# Patient Record
Sex: FEMALE | URBAN - METROPOLITAN AREA
[De-identification: names, ages, dates, MRNs, and addresses within clinical notes are randomized per-mention and may not be internally consistent; named-entity substitution may affect disease eponyms.]

---

## 2021-04-20 ENCOUNTER — APPOINTMENT (OUTPATIENT)
Dept: URBAN - METROPOLITAN AREA CLINIC 256 | Age: 56
Setting detail: DERMATOLOGY
End: 2021-04-20

## 2021-04-20 VITALS — HEIGHT: 64 IN | WEIGHT: 145 LBS | RESPIRATION RATE: 14 BRPM

## 2021-04-20 DIAGNOSIS — D22 MELANOCYTIC NEVI: ICD-10-CM

## 2021-04-20 DIAGNOSIS — L0293 CARBUNCLE AND FURUNCLE OF UNSPECIFIED SITE: ICD-10-CM

## 2021-04-20 DIAGNOSIS — L0292 CARBUNCLE AND FURUNCLE OF UNSPECIFIED SITE: ICD-10-CM

## 2021-04-20 PROBLEM — L08.9 LOCAL INFECTION OF THE SKIN AND SUBCUTANEOUS TISSUE, UNSPECIFIED: Status: ACTIVE | Noted: 2021-04-20

## 2021-04-20 PROBLEM — D22.9 MELANOCYTIC NEVI, UNSPECIFIED: Status: ACTIVE | Noted: 2021-04-20

## 2021-04-20 PROCEDURE — OTHER INTRALESIONAL KENALOG: OTHER

## 2021-04-20 PROCEDURE — 11900 INJECT SKIN LESIONS </W 7: CPT

## 2021-04-20 PROCEDURE — OTHER COUNSELING: OTHER

## 2021-04-20 PROCEDURE — 99202 OFFICE O/P NEW SF 15 MIN: CPT | Mod: 25

## 2021-04-20 PROCEDURE — OTHER REASSURANCE: OTHER

## 2021-04-20 ASSESSMENT — LOCATION ZONE DERM: LOCATION ZONE: TRUNK

## 2021-04-20 ASSESSMENT — LOCATION SIMPLE DESCRIPTION DERM: LOCATION SIMPLE: RIGHT BUTTOCK

## 2021-04-20 ASSESSMENT — LOCATION DETAILED DESCRIPTION DERM: LOCATION DETAILED: RIGHT MEDIAL BUTTOCK

## 2022-04-11 ENCOUNTER — TRANSFERRED RECORDS (OUTPATIENT)
Dept: HEALTH INFORMATION MANAGEMENT | Facility: CLINIC | Age: 57
End: 2022-04-11
Payer: OTHER GOVERNMENT

## 2022-04-13 ENCOUNTER — APPOINTMENT (OUTPATIENT)
Dept: GENERAL RADIOLOGY | Facility: CLINIC | Age: 57
End: 2022-04-13
Attending: EMERGENCY MEDICINE
Payer: OTHER GOVERNMENT

## 2022-04-13 ENCOUNTER — APPOINTMENT (OUTPATIENT)
Dept: ULTRASOUND IMAGING | Facility: CLINIC | Age: 57
End: 2022-04-13
Attending: EMERGENCY MEDICINE
Payer: OTHER GOVERNMENT

## 2022-04-13 ENCOUNTER — HOSPITAL ENCOUNTER (EMERGENCY)
Facility: CLINIC | Age: 57
Discharge: HOME | End: 2022-04-13
Attending: EMERGENCY MEDICINE | Admitting: EMERGENCY MEDICINE
Payer: OTHER GOVERNMENT

## 2022-04-13 VITALS
TEMPERATURE: 98.4 F | HEART RATE: 66 BPM | RESPIRATION RATE: 18 BRPM | DIASTOLIC BLOOD PRESSURE: 76 MMHG | SYSTOLIC BLOOD PRESSURE: 118 MMHG | WEIGHT: 146 LBS | OXYGEN SATURATION: 98 %

## 2022-04-13 DIAGNOSIS — M79.662 PAIN OF LEFT LOWER LEG: ICD-10-CM

## 2022-04-13 DIAGNOSIS — M54.6 ACUTE BILATERAL THORACIC BACK PAIN: ICD-10-CM

## 2022-04-13 LAB
ANION GAP SERPL CALCULATED.3IONS-SCNC: 4 MMOL/L (ref 3–14)
BASOPHILS # BLD AUTO: 0 10E3/UL (ref 0–0.2)
BASOPHILS NFR BLD AUTO: 0 %
BUN SERPL-MCNC: 16 MG/DL (ref 7–30)
CALCIUM SERPL-MCNC: 8.9 MG/DL (ref 8.5–10.1)
CHLORIDE BLD-SCNC: 109 MMOL/L (ref 94–109)
CO2 SERPL-SCNC: 26 MMOL/L (ref 20–32)
CREAT SERPL-MCNC: 0.56 MG/DL (ref 0.52–1.04)
D DIMER PPP FEU-MCNC: 0.37 UG/ML FEU (ref 0–0.5)
EOSINOPHIL # BLD AUTO: 0.1 10E3/UL (ref 0–0.7)
EOSINOPHIL NFR BLD AUTO: 1 %
ERYTHROCYTE [DISTWIDTH] IN BLOOD BY AUTOMATED COUNT: 12.2 % (ref 10–15)
GFR SERPL CREATININE-BSD FRML MDRD: >90 ML/MIN/1.73M2
GLUCOSE BLD-MCNC: 94 MG/DL (ref 70–99)
HCT VFR BLD AUTO: 44.6 % (ref 35–47)
HGB BLD-MCNC: 15 G/DL (ref 11.7–15.7)
HOLD SPECIMEN: NORMAL
HOLD SPECIMEN: NORMAL
IMM GRANULOCYTES # BLD: 0 10E3/UL
IMM GRANULOCYTES NFR BLD: 0 %
LYMPHOCYTES # BLD AUTO: 2.8 10E3/UL (ref 0.8–5.3)
LYMPHOCYTES NFR BLD AUTO: 38 %
MCH RBC QN AUTO: 31.8 PG (ref 26.5–33)
MCHC RBC AUTO-ENTMCNC: 33.6 G/DL (ref 31.5–36.5)
MCV RBC AUTO: 95 FL (ref 78–100)
MONOCYTES # BLD AUTO: 0.5 10E3/UL (ref 0–1.3)
MONOCYTES NFR BLD AUTO: 6 %
NEUTROPHILS # BLD AUTO: 4 10E3/UL (ref 1.6–8.3)
NEUTROPHILS NFR BLD AUTO: 55 %
NRBC # BLD AUTO: 0 10E3/UL
NRBC BLD AUTO-RTO: 0 /100
PLATELET # BLD AUTO: 239 10E3/UL (ref 150–450)
POTASSIUM BLD-SCNC: 3.8 MMOL/L (ref 3.4–5.3)
RBC # BLD AUTO: 4.72 10E6/UL (ref 3.8–5.2)
SODIUM SERPL-SCNC: 139 MMOL/L (ref 133–144)
TROPONIN I SERPL HS-MCNC: 4 NG/L
WBC # BLD AUTO: 7.4 10E3/UL (ref 4–11)

## 2022-04-13 PROCEDURE — 93971 EXTREMITY STUDY: CPT | Mod: LT

## 2022-04-13 PROCEDURE — 85379 FIBRIN DEGRADATION QUANT: CPT | Performed by: EMERGENCY MEDICINE

## 2022-04-13 PROCEDURE — 71046 X-RAY EXAM CHEST 2 VIEWS: CPT

## 2022-04-13 PROCEDURE — 84484 ASSAY OF TROPONIN QUANT: CPT | Performed by: EMERGENCY MEDICINE

## 2022-04-13 PROCEDURE — 85025 COMPLETE CBC W/AUTO DIFF WBC: CPT | Performed by: EMERGENCY MEDICINE

## 2022-04-13 PROCEDURE — 82310 ASSAY OF CALCIUM: CPT | Performed by: EMERGENCY MEDICINE

## 2022-04-13 PROCEDURE — 36415 COLL VENOUS BLD VENIPUNCTURE: CPT | Performed by: EMERGENCY MEDICINE

## 2022-04-13 PROCEDURE — 99285 EMERGENCY DEPT VISIT HI MDM: CPT | Mod: 25

## 2022-04-13 PROCEDURE — 93005 ELECTROCARDIOGRAM TRACING: CPT

## 2022-04-13 ASSESSMENT — ENCOUNTER SYMPTOMS
COUGH: 0
NUMBNESS: 1
HEADACHES: 0
ARTHRALGIAS: 1
CHILLS: 0
ROS GI COMMENTS: INCONTINENCE -
SHORTNESS OF BREATH: 1
TREMORS: 1
BACK PAIN: 1
FEVER: 0

## 2022-04-13 NOTE — ED TRIAGE NOTES
Pt reports that she went to the orthopedic today and she told them about a recent travel that she was on the road for 2 days straight. PT reports that she has also been having SOB since yesterday morning and having some light headedness. Pt reports that she also is having back pain in the middle of the shoulder blades and left sided hip pain and some numbness into the left leg, pt reports some issues with disks in her back. PT reports bilateral blurred vision that worsens with reading, no aphasia. VSS and ABC's intact, orthopedic reffered pt to ED

## 2022-04-13 NOTE — ED PROVIDER NOTES
"  History     Chief Complaint:  Multiple Symptoms     HPI:  The history is provided by the patient.      Myah Kaur is a 56 year old female with a history of anxiety, tobacco use, hyperlipidemia who presents for evaluation of various symptoms. She reports that she was at a visit with orthopedics this morning for consultation for physical therapy to manage her lumbar radiculopathy. She states that she has been experiencing left hip/lower back pain with associated numbness and tingling down her left leg which has been ongoing for many years, and this is what she was visiting the orthopedic office for today. This was the first time she had been evaluated by orthopedics for this issue in many years. During their evaluation, she noted that she was feeling \"shaky,\" and upon hearing of recent prolonged immobilization while traveling (drive to [place]), she was instructed to seek ED evaluation. She called her primary doctor's office and spoke with a triage nurse to whom she mentioned that she has been experiencing some shortness of breath since yesterday. When she also informed them that she has recently been traveling and drove to and from [place] on April 1st-2nd and April 5th, respectively, she was advised to come into the ED as soon as possible. Here in the ED, Myah also mentions that yesterday, she noticed that her vision appeared \"fuzzy\" while trying to read a book after she had been looking at her phone. She reports that this has happened before. She denies any diplopia or vision loss. She additionally notes that she has been experiencing an intermittent pain to her left upper back, and this has been bothering her more recently.  This is worsened by certain stretching movements, bending, as well as palpation. Myah denies any incontinence of urine or stool, numbness in her groin area, leg swelling, chest pain, cough, fever, chills, headaches. She denies any personal history of heart disease; " however, she notes a family history in her father. She reports that she is a tobacco smoker.    Review of Systems   Constitutional: Negative for chills and fever.   Eyes: Positive for visual disturbance.   Respiratory: Positive for shortness of breath. Negative for cough.    Cardiovascular: Negative for chest pain and leg swelling.   Gastrointestinal:        Incontinence -   Genitourinary:        Groin numbness-  Incontinence -   Musculoskeletal: Positive for arthralgias and back pain.   Neurological: Positive for tremors and numbness. Negative for headaches.   All other systems reviewed and are negative.    Allergies:  Sulfa antibiotics    Medications:  Propanolol  Hydroxyzine     Past Medical History:     Panic disorder   Anxiety   Tobacco use  Hidradenitis suppurativa  Sprain of ligament of lumbosacral joint  Hyperlipidemia   Tick bite    Past Surgical History:    Tubal ligation  Lumbar laminectomy      Family History:    Father: diabetes, heart disease  Mother: brain tumor  Brothers: MS, Crohn's, hyperlipidemia     Social History:  The patient presents to the ED with her .  The patient presents to the ED via car.  The patient is a smoker.    Physical Exam     Patient Vitals for the past 24 hrs:   BP Temp Temp src Pulse Resp SpO2 Weight   04/13/22 2145 118/76 -- -- 66 -- 98 % --   04/13/22 2100 128/83 -- -- 69 -- 100 % --   04/13/22 2015 133/79 -- -- 54 -- 98 % --   04/13/22 2000 127/80 -- -- 57 -- 99 % --   04/13/22 1546 (!) 165/88 98.4  F (36.9  C) Temporal 84 18 100 % 66.2 kg (146 lb)     Physical Exam  General:              Well-nourished              Speaking in full sentences  Eyes:              Conjunctiva without injection or scleral icterus  ENT:              Moist mucous membranes              Nares patent              Pinnae normal  Neck:              Full ROM              No stiffness appreciated  Resp:              Lungs CTAB              No crackles, wheezing or audible rubs              Good  air movement  CV:                    Normal rate, regular rhythm              S1 and S2 present              No murmur, gallop or rub  GI:              BS present              Abdomen soft without distention              Non-tender to light and deep palpation              No guarding or rebound tenderness  Skin:              Warm, dry, well perfused              No rashes or open wounds on exposed skin  MSK:              Moves all extremities              No focal deformities or swelling              No lower extremity swelling or tenderness              2+ DP pulse  Neuro:              Alert              5/5 ankle dorsi/plantarflexion              5/5 hip flexion              SILT in BLE              No clonus at the ankles              Answers questions appropriately              Moves all extremities equally              Gait stable  Psych:              Normal affect, normal mood    Emergency Department Course     ECG:  ECG taken at 1627, ECG read at 1847  Normal sinus rhythm  Normal ECG  Rate 67 bpm. UT interval 134 ms. QRS duration 88 ms. QT/QTc 400/422 ms. P-R-T axes 63 73 56.     Imaging:  US Lower Extremity Venous Duplex Left   Final Result   IMPRESSION:   1.  No deep venous thrombosis in the left lower extremity.      Chest XR,  PA & LAT   Final Result   IMPRESSION: Negative chest.      Report per radiology    Laboratory:  Labs Ordered and Resulted from Time of ED Arrival to Time of ED Departure   BASIC METABOLIC PANEL - Normal       Result Value    Sodium 139      Potassium 3.8      Chloride 109      Carbon Dioxide (CO2) 26      Anion Gap 4      Urea Nitrogen 16      Creatinine 0.56      Calcium 8.9      Glucose 94      GFR Estimate >90     TROPONIN I - Normal    Troponin I High Sensitivity 4     D DIMER QUANTITATIVE - Normal    D-Dimer Quantitative 0.37     CBC WITH PLATELETS AND DIFFERENTIAL    WBC Count 7.4      RBC Count 4.72      Hemoglobin 15.0      Hematocrit 44.6      MCV 95      MCH 31.8      MCHC  33.6      RDW 12.2      Platelet Count 239      % Neutrophils 55      % Lymphocytes 38      % Monocytes 6      % Eosinophils 1      % Basophils 0      % Immature Granulocytes 0      NRBCs per 100 WBC 0      Absolute Neutrophils 4.0      Absolute Lymphocytes 2.8      Absolute Monocytes 0.5      Absolute Eosinophils 0.1      Absolute Basophils 0.0      Absolute Immature Granulocytes 0.0      Absolute NRBCs 0.0        Emergency Department Course:       Reviewed:  I reviewed nursing notes, vitals, past medical history, Care Everywhere    Assessments:  1849 I obtained history and examined the patient as noted above.   2112 I rechecked the patient and explained findings.   2158 The patient was rechecked and updated.     Disposition:  The patient was discharged to home.     Impression & Plan     Medical Decision Making:  Myah Kaur is a 56 year old female who presents to the emergency department for evaluation of various complaints.  VS on presentation reveal elevated BP which improved during patient's ED course.  Patient's primary concern is discomfort down her left leg, having been referred to the ED for further evaluation by orthopedics.  She is concerned for DVT in light of recent car travel.  No gross swelling noted on exam.  Venous ultrasound and D-dimer within normal limits for which I feel venous thromboembolic disease to be very unlikely. Her leg symptoms are most consistent with lumbar radiculopathy.  No objective motor or sensory deficits, saddle anesthesia, bowel/bladder incontinence that would suggest cauda equina requiring MRI. She noted a episode of shortness of breath yesterday, which she attributes to anxiety.  This has resolved and she is currently asymptomatic.  In the absence of tachycardia, hypoxia, pleuritic discomfort, and normal D-dimer I feel pulmonary embolism to be unlikely and feel CT chest can be deferred safely.  She denies any associated chest pain or chest pressure.  EKG demonstrates  sinus rhythm without findings of acute ischemia and troponin has returned normal.  Do feel further cardiac evaluation can be deferred safely.  Chest x-ray negative for pneumothorax, pneumonia, or widened mediastinum.  She does acknowledge discomfort to her posterior upper thorax which is exacerbated by movement as well as position changes.  I feel this is unlikely to represent aortic dissection.  Patient lastly acknowledged transient blurriness in her vision yesterday.  She notes this is quite common and not new for her.  She denies any vision loss or diplopia.  Denies associated headache.  No other neurologic symptoms.  I do not feel this represents acute ocular pathology such as angle-closure glaucoma or intracranial pathology such as CVA, TIA warranting further imaging.  Results and clinical impression were discussed with the patient.  We discussed smoking cessation.  Continue to monitor symptoms closely.  Follow-up with PCP in 2 to 3 days for recheck or return to ED with any new or troubling symptoms. Continue with PT as previously recommended. Questions answered prior to discharge.    Diagnosis:    ICD-10-CM    1. Pain of left lower leg  M79.662    2. Acute bilateral thoracic back pain  M54.6      Scribe Disclosure:  I, Diana Ruiz, am serving as a scribe at 6:37 PM on 4/13/2022 to document services personally performed by Jerome Hurt MD based on my observations and the provider's statements to me.        Jerome Hurt MD  04/13/22 8208

## 2022-04-14 LAB
ATRIAL RATE - MUSE: 67 BPM
DIASTOLIC BLOOD PRESSURE - MUSE: NORMAL MMHG
INTERPRETATION ECG - MUSE: NORMAL
P AXIS - MUSE: 63 DEGREES
PR INTERVAL - MUSE: 134 MS
QRS DURATION - MUSE: 88 MS
QT - MUSE: 400 MS
QTC - MUSE: 422 MS
R AXIS - MUSE: 73 DEGREES
SYSTOLIC BLOOD PRESSURE - MUSE: NORMAL MMHG
T AXIS - MUSE: 56 DEGREES
VENTRICULAR RATE- MUSE: 67 BPM

## 2022-04-14 NOTE — DISCHARGE INSTRUCTIONS
Monitor symptoms closely.  Keep leg elevated.  Follow-up with PT as previously discussed    Return to ED with worsened pain, difficulties breathing, or any other concerns.

## 2022-05-22 ENCOUNTER — HEALTH MAINTENANCE LETTER (OUTPATIENT)
Age: 57
End: 2022-05-22

## 2022-09-18 ENCOUNTER — HEALTH MAINTENANCE LETTER (OUTPATIENT)
Age: 57
End: 2022-09-18

## 2023-01-29 ENCOUNTER — HEALTH MAINTENANCE LETTER (OUTPATIENT)
Age: 58
End: 2023-01-29

## 2024-02-25 ENCOUNTER — HEALTH MAINTENANCE LETTER (OUTPATIENT)
Age: 59
End: 2024-02-25

## 2024-04-04 ENCOUNTER — HOSPITAL ENCOUNTER (EMERGENCY)
Facility: CLINIC | Age: 59
Discharge: HOME | End: 2024-04-04
Attending: STUDENT IN AN ORGANIZED HEALTH CARE EDUCATION/TRAINING PROGRAM | Admitting: STUDENT IN AN ORGANIZED HEALTH CARE EDUCATION/TRAINING PROGRAM
Payer: OTHER GOVERNMENT

## 2024-04-04 VITALS
DIASTOLIC BLOOD PRESSURE: 92 MMHG | HEART RATE: 74 BPM | TEMPERATURE: 98.6 F | RESPIRATION RATE: 18 BRPM | WEIGHT: 146 LBS | HEIGHT: 66 IN | BODY MASS INDEX: 23.46 KG/M2 | SYSTOLIC BLOOD PRESSURE: 176 MMHG | OXYGEN SATURATION: 99 %

## 2024-04-04 DIAGNOSIS — R25.1 TREMOR: ICD-10-CM

## 2024-04-04 DIAGNOSIS — R03.0 ELEVATED BLOOD PRESSURE READING: ICD-10-CM

## 2024-04-04 DIAGNOSIS — R51.9 NONINTRACTABLE HEADACHE, UNSPECIFIED CHRONICITY PATTERN, UNSPECIFIED HEADACHE TYPE: ICD-10-CM

## 2024-04-04 DIAGNOSIS — T88.7XXA MEDICATION SIDE EFFECTS: Primary | ICD-10-CM

## 2024-04-04 DIAGNOSIS — F41.9 ANXIETY: ICD-10-CM

## 2024-04-04 PROBLEM — G47.00 INSOMNIA: Status: ACTIVE | Noted: 2024-04-04

## 2024-04-04 PROBLEM — M89.9 DISORDER OF BONE: Status: ACTIVE | Noted: 2024-04-04

## 2024-04-04 PROBLEM — M26.629 TENDERNESS OF TEMPOROMANDIBULAR JOINT: Status: ACTIVE | Noted: 2024-03-05

## 2024-04-04 PROBLEM — F41.0 PANIC DISORDER: Status: ACTIVE | Noted: 2021-10-18

## 2024-04-04 PROBLEM — L73.1 INGROWN HAIR: Status: ACTIVE | Noted: 2020-09-22

## 2024-04-04 LAB
ATRIAL RATE - MUSE: 64 BPM
DIASTOLIC BLOOD PRESSURE - MUSE: NORMAL MMHG
INTERPRETATION ECG - MUSE: NORMAL
P AXIS - MUSE: 67 DEGREES
PR INTERVAL - MUSE: 154 MS
QRS DURATION - MUSE: 88 MS
QT - MUSE: 402 MS
QTC - MUSE: 414 MS
R AXIS - MUSE: 59 DEGREES
SYSTOLIC BLOOD PRESSURE - MUSE: NORMAL MMHG
T AXIS - MUSE: 51 DEGREES
VENTRICULAR RATE- MUSE: 64 BPM

## 2024-04-04 PROCEDURE — 250N000011 HC RX IP 250 OP 636: Performed by: STUDENT IN AN ORGANIZED HEALTH CARE EDUCATION/TRAINING PROGRAM

## 2024-04-04 PROCEDURE — 99284 EMERGENCY DEPT VISIT MOD MDM: CPT | Mod: 25

## 2024-04-04 PROCEDURE — 96374 THER/PROPH/DIAG INJ IV PUSH: CPT

## 2024-04-04 PROCEDURE — 93005 ELECTROCARDIOGRAM TRACING: CPT

## 2024-04-04 RX ORDER — KETOROLAC TROMETHAMINE 15 MG/ML
15 INJECTION, SOLUTION INTRAMUSCULAR; INTRAVENOUS ONCE
Status: COMPLETED | OUTPATIENT
Start: 2024-04-04 | End: 2024-04-04

## 2024-04-04 RX ADMIN — KETOROLAC TROMETHAMINE 15 MG: 15 INJECTION, SOLUTION INTRAMUSCULAR; INTRAVENOUS at 17:30

## 2024-04-04 ASSESSMENT — COLUMBIA-SUICIDE SEVERITY RATING SCALE - C-SSRS
6. HAVE YOU EVER DONE ANYTHING, STARTED TO DO ANYTHING, OR PREPARED TO DO ANYTHING TO END YOUR LIFE?: NO
1. IN THE PAST MONTH, HAVE YOU WISHED YOU WERE DEAD OR WISHED YOU COULD GO TO SLEEP AND NOT WAKE UP?: NO
2. HAVE YOU ACTUALLY HAD ANY THOUGHTS OF KILLING YOURSELF IN THE PAST MONTH?: NO

## 2024-04-04 ASSESSMENT — ACTIVITIES OF DAILY LIVING (ADL)
ADLS_ACUITY_SCORE: 35

## 2024-04-04 NOTE — ED TRIAGE NOTES
Per EMS, patient coming from dentist appointment where she had tooth extraction. Post procedure, patient had high blood pressure and shaking after words. Systolic bp in 230's. Given 50 mcg Fentanyl to try to bring down BP with out success. Patient feeling anxious. Denies chest pain, SOB and dizziness.      Triage Assessment (Adult)       Row Name 04/04/24 1556          Triage Assessment    Airway WDL WDL        Respiratory WDL    Respiratory WDL WDL        Skin Circulation/Temperature WDL    Skin Circulation/Temperature WDL WDL        Cardiac WDL    Cardiac WDL WDL        Peripheral/Neurovascular WDL    Peripheral Neurovascular WDL WDL        Cognitive/Neuro/Behavioral WDL    Cognitive/Neuro/Behavioral WDL WDL

## 2024-04-04 NOTE — ED PROVIDER NOTES
"History   Chief Complaint:  Medication Reaction       HPI:  Myah Kaur is a very pleasant 58 year old female with history of TMJ presenting with a medication reaction. Patient presents from the dentist via EMS after a tooth extraction at 1445. She reports that she started to feel tremulous and \"didn't feel right\" after the tooth was pulled. Notes she felt lightheaded but the tooth was successfully pulled and the gas was off.  Going to paperwork from the dentist, the dentist administered 3 mL of lidocaine 2% with epinephrine and used nitrous oxide during to the extraction. EMS reports patient's blood pressure was 235 systolic and gave 50 mcg fentanyl en route. She was shaky and hypertensive upon arrival. Endorses a severe headache and anxiety secondary to ongoing TMJ and generally feeling uncomfortable. Patient reports increased stress. Per patient's , Myah has been experiencing \"trouble with her mouth\" for months. He notes that the shaking is new and she has never had this before. Myah reports that she was prescribed steroids for TMJ management with some relief. She recently returned from a 3 day drive from [place] with tooth pain and was told she had an abscess.     Independent Historian:  Independent history was obtained from the patient's spouse. They provided information detailed above in HPI.     Review of External Notes:  I personally reviewed notes from the patient's  dental encounter  dated today. This provided me with information regarding  medication administration and blood pressure at dental clinic .     I personally reviewed the patient's chart, including available medication list and available past medical history, past surgical history, family history, and social history.    Physical Exam   Patient Vitals for the past 24 hrs:   BP Temp Temp src Pulse Resp SpO2 Height Weight   04/04/24 1800 (!) 176/92 -- -- 74 18 99 % -- --   04/04/24 1730 (!) 146/89 -- -- 73 18 98 % -- --   04/04/24 " "1708 (!) 140/82 -- -- 76 18 98 % -- --   04/04/24 1556 (!) 194/111 -- -- -- -- 91 % -- --   04/04/24 1555 (!) 194/111 98.6  F (37  C) Oral 78 18 -- 1.676 m (5' 6\") 66.2 kg (146 lb)      Physical Exam  Vitals and nursing note reviewed.   Constitutional:       Appearance: Normal appearance.      Comments: Anxious appearing, tremulous   HENT:      Head: Normocephalic and atraumatic.      Jaw: Trismus present.      Comments: Patient has some trismus, which she states is baseline for her secondary to TMJ.     Mouth/Throat:      Mouth: Mucous membranes are moist.      Comments: There is gauze packing in the right side of the patient's mouth, overlying extracted tooth #18  Cardiovascular:      Rate and Rhythm: Normal rate and regular rhythm.      Pulses: Normal pulses.      Heart sounds: Normal heart sounds.   Musculoskeletal:         General: No signs of injury. Normal range of motion.   Neurological:      General: No focal deficit present.      Mental Status: She is alert and oriented to person, place, and time.      Cranial Nerves: No cranial nerve deficit.      Sensory: No sensory deficit.      Motor: No weakness.       Emergency Department Course     ECG:   ECG results from 04/04/24   EKG 12 lead     Value    Systolic Blood Pressure     Diastolic Blood Pressure     Ventricular Rate 64    Atrial Rate 64    WI Interval 154    QRS Duration 88        QTc 414    P Axis 67    R AXIS 59    T Axis 51    Interpretation ECG      Sinus rhythm  Normal ECG  When compared with ECG of 13-APR-2022 16:27,  No significant change was found       My interpretation     Interventions & Assessments:  Interventions:  Medications   ketorolac (TORADOL) injection 15 mg (15 mg Intravenous $Given 4/4/24 1732)        Assessments:  Consultations/Discussion of Management or Tests:  Independent Interpretation (X-rays, CT Head, rhythm strip):  ED Course as of 04/04/24 1851   Thu Apr 04, 2024   1553 I obtained patient history and performed a " physical exam.        Social Determinants of Health affecting care:   Increased stress.     Disposition:  The patient was discharged to home.     Impression & Plan          MIPS (If applicable):  N/A    Medical Decision Making:  Patient presenting with elevated blood pressure, anxiety and tremors from dentist.  Vital signs notable for elevated blood pressure but otherwise reassuring.  Physical exam notable for patient appearing female with mild intermittent tremors.  Suspicion high for medication side effect, possible mild lidocaine toxicity.  The 3 mL that was reportedly used is well below the toxic threshold.  Did not suspect patient is at risk for LAST syndrome.  Did obtain EKG, which was reassuring.  Acute stress reaction or anxiety is also possible, especially considering patient's history of generalized anxiety disorder and panic disorder.  Did proceed to observe the patient in the emergency department for almost 3 hours and the patient had improvement in his symptoms, such that the tremors and significant anxiety had resolved.  Her blood pressure also reduced, though remained elevated.  She did have a headache and was treated successfully with Toradol.  She was able to call her dentist and obtain follow-up and instructions.  Felt patient was appropriate for discharge. Findings were discussed.  Additional verbal instructions were provided.  I discussed specific warning signs and instructed the patient to return to the emergency department if there are any concerns. Understanding of instructions was voiced, questions were answered and the patient was discharged.     Diagnosis:    ICD-10-CM    1. Medication side effects  T88.7XXA       2. Elevated blood pressure reading  R03.0       3. Tremor  R25.1       4. Nonintractable headache, unspecified chronicity pattern, unspecified headache type  R51.9       5. Anxiety  F41.9            Discharge Medications:  There are no discharge medications for this  patient.      Ibis Gibbons  4/4/2024      Darrin Ugalde MD  04/04/24 [id number]

## 2024-04-04 NOTE — DISCHARGE INSTRUCTIONS
Thank you for allowing us to evaluate you today.  Follow up with primary care clinician  in 1 week as needed for reevaluation..  Immediately return to the emergency department with any concerns.

## 2024-04-04 NOTE — ED NOTES
Bed: ED21  Expected date:   Expected time:   Means of arrival:   Comments:  Katharine 2 58 F post op complications after tooth extraction agitated and hypertensive 220/p

## 2024-11-07 ENCOUNTER — APPOINTMENT (OUTPATIENT)
Dept: URBAN - METROPOLITAN AREA CLINIC 253 | Age: 59
Setting detail: DERMATOLOGY
End: 2024-11-08

## 2024-11-07 VITALS — WEIGHT: 145 LBS | RESPIRATION RATE: 14 BRPM | HEIGHT: 65 IN

## 2024-11-07 DIAGNOSIS — L73.2 HIDRADENITIS SUPPURATIVA: ICD-10-CM

## 2024-11-07 PROCEDURE — 99204 OFFICE O/P NEW MOD 45 MIN: CPT | Mod: 25

## 2024-11-07 PROCEDURE — OTHER COUNSELING: OTHER

## 2024-11-07 PROCEDURE — OTHER INTRALESIONAL KENALOG: OTHER

## 2024-11-07 PROCEDURE — 11900 INJECT SKIN LESIONS </W 7: CPT

## 2024-11-07 PROCEDURE — OTHER PRESCRIPTION: OTHER

## 2024-11-07 PROCEDURE — OTHER PRESCRIPTION MEDICATION MANAGEMENT: OTHER

## 2024-11-07 PROCEDURE — OTHER SEPARATE AND IDENTIFIABLE DOCUMENTATION: OTHER

## 2024-11-07 PROCEDURE — OTHER ADDITIONAL NOTES: OTHER

## 2024-11-07 PROCEDURE — OTHER MIPS QUALITY: OTHER

## 2024-11-07 RX ORDER — CLINDAMYCIN PHOSPHATE 10 MG/ML
SOLUTION TOPICAL
Qty: 60 | Refills: 5 | Status: ERX | COMMUNITY
Start: 2024-11-07

## 2024-11-07 RX ORDER — DOXYCYCLINE 100 MG/1
TABLET, FILM COATED ORAL BID
Qty: 60 | Refills: 0 | Status: ERX | COMMUNITY
Start: 2024-11-07

## 2024-11-07 RX ORDER — CHLORHEXIDINE GLUCONATE 213 G/1000ML
SOLUTION TOPICAL
Qty: 236 | Refills: 5 | Status: ERX | COMMUNITY
Start: 2024-11-07

## 2024-11-07 ASSESSMENT — LOCATION SIMPLE DESCRIPTION DERM
LOCATION SIMPLE: GLUTEAL CLEFT
LOCATION SIMPLE: GROIN

## 2024-11-07 ASSESSMENT — LOCATION ZONE DERM
LOCATION ZONE: VULVA
LOCATION ZONE: TRUNK

## 2024-11-07 ASSESSMENT — LOCATION DETAILED DESCRIPTION DERM
LOCATION DETAILED: MONS PUBIS
LOCATION DETAILED: GLUTEAL CLEFT

## 2024-11-07 NOTE — HPI: SKIN LESION
What Type Of Note Output Would You Prefer (Optional)?: Standard Output
How Severe Is Your Skin Lesion?: moderate
Is This A New Presentation, Or A Follow-Up?: Skin Lesions
Additional History: Patient states the lesions are inflamed. She is concerned for infection.

## 2024-11-07 NOTE — PROCEDURE: INTRALESIONAL KENALOG
Consent: The risks of atrophy were reviewed with the patient.
Include Z78.9 (Other Specified Conditions Influencing Health Status) As An Associated Diagnosis?: No
Expiration Date For Kenalog (Optional): April 2026
X Size Of Lesion In Cm (Optional): 0
Show Inventory Tab: Hide
Ndc# For Kenalog Only: 4741-9702-82
Kenalog Preparation: Kenalog
Concentration Of Kenalog Solution Injected (Mg/Ml): 10.0
Total Volume (Ccs): 0.2
Detail Level: Detailed
Medical Necessity Clause: This procedure was medically necessary because the lesions that were treated were:
Lot # For Kenalog (Optional): [id number]
Kenalog Type Of Vial: Multiple Dose
Administered By (Optional): Salome Bella PA-C
Validate Note Data When Using Inventory: Yes

## 2024-11-07 NOTE — PROCEDURE: PRESCRIPTION MEDICATION MANAGEMENT
Initiate Treatment: doxycycline monohydrate 100 mg tablet BID\\nclindamycin 1 % lotion QD\\nHibiclens 4 % topical liquid
Render In Strict Bullet Format?: No
Detail Level: Zone

## 2024-11-07 NOTE — PROCEDURE: ADDITIONAL NOTES
Render Risk Assessment In Note?: no
Detail Level: Simple
Additional Notes: -Discussed that ILK are a treatment option for those who experience less frequent flares. Patient explained that her flares are infrequent. \\n-Explained that the definitive treatment option is surgical removal, however, lancing and draining is not ideal, as it tends to move and can leave unwanted scar tissue. \\n-Patient aware of biologic treatment options, such as Cosentyx and Humira.\\n-Recommended use of an antiseptic wash and antibacterial solution to help control at home\\n-Patient asked if squeezing the lesions is OK—patient aware that if lesion is oozing, it is fine, otherwise avoid squeezing, as to not exacerbate inflammation.

## 2025-01-12 ENCOUNTER — HEALTH MAINTENANCE LETTER (OUTPATIENT)
Age: 60
End: 2025-01-12

## 2025-03-15 ENCOUNTER — HEALTH MAINTENANCE LETTER (OUTPATIENT)
Age: 60
End: 2025-03-15

## 2025-04-24 ENCOUNTER — APPOINTMENT (OUTPATIENT)
Dept: URBAN - METROPOLITAN AREA CLINIC 253 | Age: 60
Setting detail: DERMATOLOGY
End: 2025-04-29

## 2025-04-24 VITALS — WEIGHT: 147 LBS | RESPIRATION RATE: 15 BRPM | HEIGHT: 55 IN

## 2025-04-24 DIAGNOSIS — D18.0 HEMANGIOMA: ICD-10-CM

## 2025-04-24 DIAGNOSIS — L73.2 HIDRADENITIS SUPPURATIVA: ICD-10-CM

## 2025-04-24 DIAGNOSIS — D22 MELANOCYTIC NEVI: ICD-10-CM

## 2025-04-24 DIAGNOSIS — L81.4 OTHER MELANIN HYPERPIGMENTATION: ICD-10-CM

## 2025-04-24 DIAGNOSIS — L82.1 OTHER SEBORRHEIC KERATOSIS: ICD-10-CM

## 2025-04-24 DIAGNOSIS — Z71.89 OTHER SPECIFIED COUNSELING: ICD-10-CM

## 2025-04-24 PROBLEM — D22.5 MELANOCYTIC NEVI OF TRUNK: Status: ACTIVE | Noted: 2025-04-24

## 2025-04-24 PROBLEM — D18.01 HEMANGIOMA OF SKIN AND SUBCUTANEOUS TISSUE: Status: ACTIVE | Noted: 2025-04-24

## 2025-04-24 PROCEDURE — OTHER PRESCRIPTION MEDICATION MANAGEMENT: OTHER

## 2025-04-24 PROCEDURE — 99214 OFFICE O/P EST MOD 30 MIN: CPT

## 2025-04-24 PROCEDURE — OTHER ADDITIONAL NOTES: OTHER

## 2025-04-24 PROCEDURE — OTHER MIPS QUALITY: OTHER

## 2025-04-24 PROCEDURE — OTHER COUNSELING: OTHER

## 2025-04-24 ASSESSMENT — LOCATION ZONE DERM
LOCATION ZONE: TRUNK
LOCATION ZONE: VULVA

## 2025-04-24 ASSESSMENT — LOCATION DETAILED DESCRIPTION DERM
LOCATION DETAILED: MONS PUBIS
LOCATION DETAILED: SUPERIOR LUMBAR SPINE
LOCATION DETAILED: INFERIOR THORACIC SPINE

## 2025-04-24 ASSESSMENT — LOCATION SIMPLE DESCRIPTION DERM
LOCATION SIMPLE: UPPER BACK
LOCATION SIMPLE: LOWER BACK
LOCATION SIMPLE: GROIN

## 2025-04-24 NOTE — HPI: FULL BODY SKIN EXAMINATION
What Type Of Note Output Would You Prefer (Optional)?: Standard Output
What Is The Reason For Today's Visit?: Full Body Skin Examination with No Concerns
What Is The Reason For Today's Visit? (Being Monitored For X): concerning skin lesions on an annual basis
Additional History: Patient reports no specific spots of concern but she does have HS and wants that checked out.

## 2025-04-24 NOTE — PROCEDURE: PRESCRIPTION MEDICATION MANAGEMENT
Continue Regimen: clindamycin 1 % lotion QD\\nHibiclens 4 % topical liquid
Render In Strict Bullet Format?: No
Detail Level: Zone

## 2025-04-24 NOTE — PROCEDURE: ADDITIONAL NOTES
Render Risk Assessment In Note?: no
Detail Level: Simple
Additional Notes: -informed patient that if it becomes painfully inflamed again to come back for more ILK injections.\\n-Discussed that ILK are a treatment option for those who experience less frequent flares. Patient explained that her flares are infrequent. \\n-Patient asked if squeezing the lesions is OK—patient aware that if lesion is oozing, it is fine, otherwise avoid squeezing, as to not exacerbate inflammation.

## 2025-06-09 ENCOUNTER — APPOINTMENT (OUTPATIENT)
Dept: URBAN - METROPOLITAN AREA CLINIC 252 | Age: 60
Setting detail: DERMATOLOGY
End: 2025-06-09

## 2025-06-09 VITALS — WEIGHT: 145 LBS | HEIGHT: 65 IN | RESPIRATION RATE: 16 BRPM

## 2025-06-09 DIAGNOSIS — L0390 CELLULITIS AND ABSCESS OF UNSPECIFIED SITES: ICD-10-CM

## 2025-06-09 DIAGNOSIS — L73.2 HIDRADENITIS SUPPURATIVA: ICD-10-CM

## 2025-06-09 DIAGNOSIS — L0391 CELLULITIS AND ABSCESS OF UNSPECIFIED SITES: ICD-10-CM

## 2025-06-09 PROBLEM — L03.311 CELLULITIS OF ABDOMINAL WALL: Status: ACTIVE | Noted: 2025-06-09

## 2025-06-09 PROCEDURE — OTHER INCISION AND DRAINAGE: OTHER

## 2025-06-09 PROCEDURE — OTHER PRESCRIPTION: OTHER

## 2025-06-09 PROCEDURE — OTHER COUNSELING: OTHER

## 2025-06-09 PROCEDURE — 99213 OFFICE O/P EST LOW 20 MIN: CPT | Mod: 25

## 2025-06-09 PROCEDURE — OTHER MEDICATION COUNSELING: OTHER

## 2025-06-09 PROCEDURE — 10060 I&D ABSCESS SIMPLE/SINGLE: CPT

## 2025-06-09 PROCEDURE — OTHER ADDITIONAL NOTES: OTHER

## 2025-06-09 RX ORDER — DOXYCYCLINE MONOHYDRATE 100 MG/1
CAPSULE ORAL BID
Qty: 10 | Refills: 0 | Status: ERX | COMMUNITY
Start: 2025-06-09

## 2025-06-09 RX ORDER — FLUCONAZOLE 150 MG/1
TABLET ORAL
Qty: 2 | Refills: 0 | Status: ERX | COMMUNITY
Start: 2025-06-09

## 2025-06-09 ASSESSMENT — LOCATION SIMPLE DESCRIPTION DERM
LOCATION SIMPLE: ABDOMEN
LOCATION SIMPLE: LEFT BREAST

## 2025-06-09 ASSESSMENT — LOCATION DETAILED DESCRIPTION DERM
LOCATION DETAILED: LEFT INFRAMAMMARY CREASE (INNER QUADRANT)
LOCATION DETAILED: LEFT RIB CAGE

## 2025-06-09 ASSESSMENT — LOCATION ZONE DERM: LOCATION ZONE: TRUNK

## 2025-06-10 ENCOUNTER — RX ONLY (RX ONLY)
Age: 60
End: 2025-06-10

## 2025-06-10 RX ORDER — DOXYCYCLINE MONOHYDRATE 100 MG/1
CAPSULE ORAL BID
Qty: 20 | Refills: 0 | Status: ERX

## 2025-06-11 ENCOUNTER — RX ONLY (RX ONLY)
Age: 60
End: 2025-06-11

## 2025-06-11 RX ORDER — CEPHALEXIN 500 MG/1
CAPSULE ORAL
Qty: 40 | Refills: 0 | Status: ERX | COMMUNITY
Start: 2025-06-11

## 2025-06-23 ENCOUNTER — OFFICE VISIT (OUTPATIENT)
Dept: URGENT CARE | Facility: URGENT CARE | Age: 60
End: 2025-06-23
Payer: OTHER GOVERNMENT

## 2025-06-23 VITALS
OXYGEN SATURATION: 100 % | TEMPERATURE: 97.2 F | HEART RATE: 64 BPM | SYSTOLIC BLOOD PRESSURE: 144 MMHG | DIASTOLIC BLOOD PRESSURE: 88 MMHG | HEIGHT: 64 IN | WEIGHT: 149 LBS | BODY MASS INDEX: 25.44 KG/M2 | RESPIRATION RATE: 18 BRPM

## 2025-06-23 DIAGNOSIS — S60.459A ACUTE FOREIGN BODY OF FINGERNAIL, INITIAL ENCOUNTER: Primary | ICD-10-CM

## 2025-06-23 PROCEDURE — 99203 OFFICE O/P NEW LOW 30 MIN: CPT | Performed by: FAMILY MEDICINE

## 2025-06-23 RX ORDER — CEPHALEXIN 500 MG/1
500 CAPSULE ORAL 2 TIMES DAILY
Qty: 10 CAPSULE | Refills: 0 | Status: SHIPPED | OUTPATIENT
Start: 2025-06-23 | End: 2025-06-28

## 2025-06-23 NOTE — PATIENT INSTRUCTIONS
Cold compresses for the finger to reduce swelling from the recent injury    If the area becomes more red and swollen start the antibiotic    Loss of function of the finger we cannot bend it or becomes more swollen and spreads to the front of the finger -- then please seek help right away      Make appointment to see hand surgeon

## 2025-06-23 NOTE — PROGRESS NOTES
Urgent Care Clinic Visit    Chief Complaint   Patient presents with    Finger     Sliver under right middle fingernail x2 days, finger starting to feel warm              6/23/2025     4:46 PM   Additional Questions   Roomed by Flavia         6/23/2025     4:46 PM   Patient Reported Additional Medications   Patient reports taking the following new medications Atovastatin, Losartan

## 2025-06-24 ENCOUNTER — PATIENT OUTREACH (OUTPATIENT)
Dept: CARE COORDINATION | Facility: CLINIC | Age: 60
End: 2025-06-24
Payer: OTHER GOVERNMENT

## 2025-06-24 ENCOUNTER — APPOINTMENT (OUTPATIENT)
Dept: URBAN - METROPOLITAN AREA CLINIC 253 | Age: 60
Setting detail: DERMATOLOGY
End: 2025-06-26

## 2025-06-24 VITALS — WEIGHT: 146 LBS | RESPIRATION RATE: 14 BRPM | HEIGHT: 64 IN

## 2025-06-24 DIAGNOSIS — L73.2 HIDRADENITIS SUPPURATIVA: ICD-10-CM

## 2025-06-24 PROCEDURE — 11900 INJECT SKIN LESIONS </W 7: CPT

## 2025-06-24 PROCEDURE — OTHER SEPARATE AND IDENTIFIABLE DOCUMENTATION: OTHER

## 2025-06-24 PROCEDURE — OTHER MIPS QUALITY: OTHER

## 2025-06-24 PROCEDURE — OTHER INTRALESIONAL KENALOG: OTHER

## 2025-06-24 PROCEDURE — OTHER COUNSELING: OTHER

## 2025-06-24 PROCEDURE — 99212 OFFICE O/P EST SF 10 MIN: CPT | Mod: 25

## 2025-06-24 ASSESSMENT — LOCATION ZONE DERM: LOCATION ZONE: TRUNK

## 2025-06-24 ASSESSMENT — LOCATION DETAILED DESCRIPTION DERM
LOCATION DETAILED: LEFT RIB CAGE
LOCATION DETAILED: RIGHT BUTTOCK
LOCATION DETAILED: LEFT SUPRAPUBIC SKIN

## 2025-06-24 ASSESSMENT — LOCATION SIMPLE DESCRIPTION DERM
LOCATION SIMPLE: GROIN
LOCATION SIMPLE: BUTTOCK
LOCATION SIMPLE: ABDOMEN

## 2025-06-26 ENCOUNTER — PATIENT OUTREACH (OUTPATIENT)
Dept: CARE COORDINATION | Facility: CLINIC | Age: 60
End: 2025-06-26
Payer: OTHER GOVERNMENT

## 2025-06-27 ENCOUNTER — MEDICAL CORRESPONDENCE (OUTPATIENT)
Dept: HEALTH INFORMATION MANAGEMENT | Facility: CLINIC | Age: 60
End: 2025-06-27
Payer: OTHER GOVERNMENT

## 2025-06-30 ENCOUNTER — TRANSCRIBE ORDERS (OUTPATIENT)
Dept: OTHER | Age: 60
End: 2025-06-30

## 2025-06-30 DIAGNOSIS — J84.10 PULMONARY FIBROSIS (H): ICD-10-CM

## 2025-06-30 DIAGNOSIS — R93.89 ABNORMAL CHEST CT: Primary | ICD-10-CM

## 2025-07-02 ENCOUNTER — TELEPHONE (OUTPATIENT)
Dept: PULMONOLOGY | Facility: CLINIC | Age: 60
End: 2025-07-02
Payer: OTHER GOVERNMENT

## 2025-07-02 DIAGNOSIS — J84.9 ILD (INTERSTITIAL LUNG DISEASE) (H): Primary | ICD-10-CM

## 2025-07-02 NOTE — TELEPHONE ENCOUNTER
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 No    In review of apparent records and conversation with patient; recommend first visit with ILD provider be conducted :  In person visit  Testing needed: Full PFT's and walk only    Additional notes:          Leaves the [facility] for the winter by 11/15/25

## 2025-08-14 DIAGNOSIS — J84.9 ILD (INTERSTITIAL LUNG DISEASE) (H): ICD-10-CM

## 2025-08-14 LAB
6 MIN WALK (FT): 1440 FT
6 MIN WALK (M): 439 M
DLCOUNC-%PRED-PRE: 82 %
DLCOUNC-PRE: 16.22 ML/MIN/MMHG
DLCOUNC-PRED: 19.77 ML/MIN/MMHG
ERV-%PRED-PRE: 131 %
ERV-PRE: 1.11 L
ERV-PRED: 0.84 L
EXPTIME-PRE: 5.96 SEC
FEF2575-%PRED-PRE: 131 %
FEF2575-PRE: 2.85 L/SEC
FEF2575-PRED: 2.16 L/SEC
FEFMAX-%PRED-PRE: 122 %
FEFMAX-PRE: 7.7 L/SEC
FEFMAX-PRED: 6.3 L/SEC
FEV1-%PRED-PRE: 110 %
FEV1-PRE: 2.63 L
FEV1FEV6-PRE: 88 %
FEV1FEV6-PRED: 81 %
FEV1FVC-PRE: 83 %
FEV1FVC-PRED: 80 %
FEV1SVC-PRE: 81 L
FEV1SVC-PRED: 70 L
FIFMAX-PRE: 5.19 L/SEC
FRCPLETH-%PRED-PRE: 97 %
FRCPLETH-PRE: 2.64 L
FRCPLETH-PRED: 2.71 L
FVC-%PRED-PRE: 106 %
FVC-PRE: 3.17 L
FVC-PRED: 2.99 L
GAW-PRED: 1.03 L/S/CMH2O
IC-%PRED-PRE: 78 %
IC-PRE: 1.95 L
IC-PRED: 2.47 L
Lab: 103 %
RVPLETH-%PRED-PRE: 78 %
RVPLETH-PRE: 1.35 L
RVPLETH-PRED: 1.72 L
SGAW-PRED: 0.2 1/CMH2O*S
SRAW-PRED: < 4.76 CMH2O*S
TLCPLETH-%PRED-PRE: 89 %
TLCPLETH-PRE: 4.59 L
TLCPLETH-PRED: 5.11 L
VA-%PRED-PRE: 98 %
VA-PRE: 4.62 L
VC-%PRED-PRE: 95 %
VC-PRE: 3.25 L
VC-PRED: 3.41 L

## 2025-08-19 ENCOUNTER — CARE COORDINATION (OUTPATIENT)
Dept: PULMONOLOGY | Facility: CLINIC | Age: 60
End: 2025-08-19

## 2025-08-19 ENCOUNTER — OFFICE VISIT (OUTPATIENT)
Dept: PULMONOLOGY | Facility: CLINIC | Age: 60
End: 2025-08-19
Payer: OTHER GOVERNMENT

## 2025-08-19 VITALS
OXYGEN SATURATION: 100 % | WEIGHT: 146 LBS | DIASTOLIC BLOOD PRESSURE: 79 MMHG | SYSTOLIC BLOOD PRESSURE: 120 MMHG | HEART RATE: 75 BPM | BODY MASS INDEX: 25.06 KG/M2

## 2025-08-19 DIAGNOSIS — R93.89 ABNORMAL CHEST CT: ICD-10-CM

## 2025-08-19 PROCEDURE — 99205 OFFICE O/P NEW HI 60 MIN: CPT | Mod: 25 | Performed by: INTERNAL MEDICINE

## 2025-08-19 RX ORDER — ATORVASTATIN CALCIUM 20 MG/1
20 TABLET, FILM COATED ORAL DAILY
COMMUNITY
Start: 2024-07-10

## 2025-08-19 RX ORDER — HYDROXYZINE HYDROCHLORIDE 25 MG/1
25-50 TABLET, FILM COATED ORAL PRN
COMMUNITY
Start: 2025-07-15

## 2025-08-19 RX ORDER — LOSARTAN POTASSIUM 25 MG/1
25 TABLET ORAL DAILY
COMMUNITY
Start: 2024-07-10

## 2025-08-19 RX ORDER — CLINDAMYCIN PHOSPHATE 11.9 MG/ML
SOLUTION TOPICAL PRN
COMMUNITY

## 2025-08-19 ASSESSMENT — PAIN SCALES - GENERAL: PAINLEVEL_OUTOF10: NO PAIN (0)
